# Patient Record
Sex: MALE | NOT HISPANIC OR LATINO | URBAN - METROPOLITAN AREA
[De-identification: names, ages, dates, MRNs, and addresses within clinical notes are randomized per-mention and may not be internally consistent; named-entity substitution may affect disease eponyms.]

---

## 2017-07-10 ENCOUNTER — APPOINTMENT (OUTPATIENT)
Dept: URBAN - METROPOLITAN AREA CLINIC 319 | Age: 27
Setting detail: DERMATOLOGY
End: 2017-07-10

## 2017-07-10 DIAGNOSIS — A63.0 ANOGENITAL (VENEREAL) WARTS: ICD-10-CM

## 2017-07-10 DIAGNOSIS — D22 MELANOCYTIC NEVI: ICD-10-CM

## 2017-07-10 PROBLEM — D22.71 MELANOCYTIC NEVI OF RIGHT LOWER LIMB, INCLUDING HIP: Status: ACTIVE | Noted: 2017-07-10

## 2017-07-10 PROCEDURE — 99202 OFFICE O/P NEW SF 15 MIN: CPT | Mod: 25

## 2017-07-10 PROCEDURE — OTHER REASSURANCE: OTHER

## 2017-07-10 PROCEDURE — 17110 DESTRUCT B9 LESION 1-14: CPT

## 2017-07-10 PROCEDURE — OTHER OTHER: OTHER

## 2017-07-10 PROCEDURE — OTHER LIQUID NITROGEN: OTHER

## 2017-07-10 PROCEDURE — OTHER COUNSELING: OTHER

## 2017-07-10 PROCEDURE — OTHER TREATMENT REGIMEN: OTHER

## 2017-07-10 PROCEDURE — OTHER MIPS QUALITY: OTHER

## 2017-07-10 PROCEDURE — OTHER PRESCRIPTION: OTHER

## 2017-07-10 RX ORDER — IMIQUIMOD 5 %
CREAM IN PACKET (EA) TOPICAL TIW
Qty: 1 | Refills: 3 | Status: ERX | COMMUNITY
Start: 2017-07-10

## 2017-07-10 ASSESSMENT — LOCATION SIMPLE DESCRIPTION DERM
LOCATION SIMPLE: RIGHT FOOT
LOCATION SIMPLE: PENIS
LOCATION SIMPLE: GENITALIA

## 2017-07-10 ASSESSMENT — LOCATION DETAILED DESCRIPTION DERM
LOCATION DETAILED: RIGHT DORSAL FOOT
LOCATION DETAILED: DORSAL GLANS
LOCATION DETAILED: GENITALIA

## 2017-07-10 ASSESSMENT — LOCATION ZONE DERM
LOCATION ZONE: PENIS
LOCATION ZONE: GENITALIA
LOCATION ZONE: FEET

## 2017-07-10 NOTE — PROCEDURE: TREATMENT REGIMEN
Initiate Treatment: Aldara cream apply to wart at bedtime Monday,Wednesday,friday x 16 days (don't start until they heal)
Detail Level: Detailed

## 2017-07-10 NOTE — PROCEDURE: MIPS QUALITY
Quality 111:Pneumonia Vaccination Status For Older Adults: Pneumococcal Vaccination not Administered or Previously Received, Reason not Otherwise Specified
Quality 110: Preventive Care And Screening: Influenza Immunization: Influenza Immunization not Administered for Documented Reasons.
Detail Level: Detailed

## 2017-07-10 NOTE — PROCEDURE: OTHER
Other (Free Text): Dr. Bernabe treated patient today
Detail Level: Simple
Note Text (......Xxx Chief Complaint.): This diagnosis correlates with the

## 2017-07-10 NOTE — PROCEDURE: LIQUID NITROGEN
Add 52 Modifier (Optional): no
Number Of Freeze-Thaw Cycles: 1 freeze-thaw cycle
Include Z78.9 (Other Specified Conditions Influencing Health Status) As An Associated Diagnosis?: Yes
Post-Care Instructions: Apply Vaseline to treated area(s) three times a day to help soothe pain from freezing
Medical Necessity Clause: Itchy and painful and inflamed
Detail Level: Detailed
Medical Necessity Information: It is in your best interest to select a reason for this procedure from the list below. All of these items fulfill various CMS LCD requirements except the new and changing color options.